# Patient Record
Sex: MALE | Race: WHITE
[De-identification: names, ages, dates, MRNs, and addresses within clinical notes are randomized per-mention and may not be internally consistent; named-entity substitution may affect disease eponyms.]

---

## 2019-01-28 ENCOUNTER — HOSPITAL ENCOUNTER (OUTPATIENT)
Dept: HOSPITAL 76 - SDS | Age: 64
Discharge: HOME | End: 2019-01-28
Attending: SURGERY
Payer: COMMERCIAL

## 2019-01-28 VITALS — SYSTOLIC BLOOD PRESSURE: 131 MMHG | DIASTOLIC BLOOD PRESSURE: 79 MMHG

## 2019-01-28 DIAGNOSIS — K43.6: Primary | ICD-10-CM

## 2019-01-28 DIAGNOSIS — K42.9: ICD-10-CM

## 2019-01-28 DIAGNOSIS — J31.0: ICD-10-CM

## 2019-01-28 PROCEDURE — 49561: CPT

## 2019-01-28 PROCEDURE — 74018 RADEX ABDOMEN 1 VIEW: CPT

## 2019-01-28 PROCEDURE — 49585: CPT

## 2019-01-28 PROCEDURE — 0WUF0JZ SUPPLEMENT ABDOMINAL WALL WITH SYNTHETIC SUBSTITUTE, OPEN APPROACH: ICD-10-PCS | Performed by: SURGERY

## 2019-01-28 PROCEDURE — 0WQF0ZZ REPAIR ABDOMINAL WALL, OPEN APPROACH: ICD-10-PCS | Performed by: SURGERY

## 2019-01-28 NOTE — ANESTHESIA
Pre-Anesthesia VS, & Labs





- Diagnosis





umbilical hernia, supra-umbilical ventral hernia





- Procedure





open umbilical hernia repair, supra-umbilical hernia (ventral) repair with mesh


Vital Signs: 





                                        











Temp Pulse Resp BP Pulse Ox


 


 36.2 C L  44 L  20   138/84 H  100 


 


 01/28/19 12:20  01/28/19 12:20  01/28/19 12:20  01/28/19 12:20  01/28/19 12:20














                                        





Height                           6 ft


Weight (kg)                      82.9 kg











- NPO


>8 hours





Home Medications and Allergies


Home Medications: 


Ambulatory Orders





Latanoprost 0.005% Ophth Drops [Xalatan Ophth Drops] 1 drops OP DAILY 01/28/19 


Timolol 0.25% Ophth Drops [Timoptic 0.25% Ophth Drops] 1 drops OP DAILY 01/28/19













                                        





Latanoprost 0.005% Ophth Drops [Xalatan Ophth Drops] 1 drops OP DAILY 01/28/19 


Timolol 0.25% Ophth Drops [Timoptic 0.25% Ophth Drops] 1 drops OP DAILY 01/28/19










Allergies/Adverse Reactions: 


                                    Allergies











Allergy/AdvReac Type Severity Reaction Status Date / Time


 


No Known Drug Allergies Allergy   Verified 01/28/19 12:44














Anes History & Medical History





- Anesthetic History


Anesthesia Complications: reports: No previous complications





- Medical History


Cardiovascular: reports: None


Pulmonary: reports: None


Gastrointestinal: reports: None


Urinary: reports: None


Musculoskeletal: reports: None


Endocrine/Autoimmune: reports: None


Skin: reports: None





- Surgical History


General: Colonoscopy


Eyes Ears Nose Throat (EENT): Other (retinal detachment surgery)





Exam


General: Alert


Dental: WNL


Mouth Opening: Greater than 4 Fingerbreadths


Neck Mobility: Normal


Mallampati classification: I


Thyromental Distance: greater than 6 cm


Respiratory: Lungs clear


Cardiovascular: Regular rate


Mental/Cognitive Status: Alert/Oriented X3





Plan


Anesthesia Type: General (backup general), MAC


Consent for Procedure(s) Verified and Reviewed: Yes


Code Status: Attempt Resuscitation


ASA classification: 1-Healthy patient


Is this case an emergency?: No

## 2019-01-28 NOTE — XRAY REPORT
Reason:  NEEDLE/SPONGE COUNT INCORRECT

Procedure Date:  01/28/2019   

Accession Number:  [id number] / [id number]                    

Procedure:  XR  - Abdomen 1 View X-Ray CPT Code:  14962

 

FULL RESULT:

 

 

EXAM:

ABDOMEN RADIOGRAPHY

 

EXAM DATE: 1/28/2019 04:12 PM.

 

CLINICAL HISTORY: NEEDLE/SPONGE COUNT INCORRECT.

 

COMPARISON: None.

 

TECHNIQUE: 1 view.

 

FINDINGS:

Bowel Gas Pattern: A single image was obtained of the central lower 

abdomen from the mid L1 level to the lower sacrum. No foreign body seen 

within this field of view

 

Other: None.

 

IMPRESSION: No foreign body seen within the central lower abdomen

 

RADIA

 

The call report notification system was initiated by Dr. Akhil Orr at 04:16 PM hrs on 1/28/2019.

ADDENDUM: 01/28/19 16:18

 

The above findings  were discussed with Dr. Shaka rByant Dr by Dr. Akhil Orr at 04:18 PM hrs on 1/28/2019.

## 2019-01-29 NOTE — OPERATIVE REPORT
DATE OF SERVICE: 01/28/2019

Physician: Shaka Bryant MD

 

PREOPERATIVE DIAGNOSES  

   1. Incarcerated ventral hernia.

   2. Reducible umbilical hernia.

 

POSTOPERATIVE DIAGNOSES

   1. Incarcerated ventral hernia.

   2. Reducible umbilical hernia.

 

PROCEDURE  

   1. Open repair of incarcerated ventral hernia.

   2. Open repair of umbilical hernia with Ventralex ST soft tissue patch.

 

ANESTHESIA:  Local plus monitored anesthesia care by Spencer Abdi CRNA.

 

SURGEON:  Shaka Bryant MD.

 

ASSISTANT:  None.

 

ESTIMATED BLOOD LOSS:  10 mL

 

COMPLICATIONS:  None.

 

FINDINGS:  Two adjacent ventral midline supraumbilical fascial defects were present, measuring 5 mm i
n diameter each, through which preperitoneal fat had herniated and become incarcerated, creating a ma
ss of approximately 3 to 4 cm in diameter.  In addition, an umbilical hernia was present with a fasci
al defect of approximately 2 cm and preperitoneal fat present, comprising the hernia sac.

 

INDICATIONS:  Patient is a 63-year-old gentleman with recent onset of a mass in the supraumbilical re
gion, just off the midline to the left.  Examination showed a reducible soft mass consistent with a c
hronically incarcerated ventral hernia.  He was also noted to have a reducible umbilical hernia.  He 
was interested in having both hernias repaired.

 

TECHNIQUE:  After informed consent, patient was taken to the operating room where he was sedated and 
monitored.  Preoperative preparation included administration of 2 grams of cefazolin intravenously an
d application of sequential calf compression boots.  His abdomen was clipped and prepared with Chlora
Prep solution and draped in the usual sterile fashion.  A field block was instituted using a 50:50 co
mbination of 1% lidocaine plain and 0.5% Marcaine with epinephrine, a total of 30 mL of the mixture w
as used.  

 

A curvilinear transverse incision was made just above the umbilicus and just caudal to the mass and c
arried down through subcutaneous tissues.  Hemostasis achieved with electrocautery and 2-0 Vicryl tie
s.  The umbilical dermis was dissected off the underlying hernia sac and the fascial edges were mobil
ized circumferentially around the umbilical fascial defect.  The hernia sac was excised and discarded
.  Dissection in a cephalad manner exposed 2 adjacent midline fascial defects through which preperito
abdoul fat had herniated and incarcerated.  This tissue was mobilized circumferentially.  The anterior 
fascia was mobilized around the neck of the 2 defects.  The tissue was amputated.  Hemostasis was ach
ieved with electrocautery and 2-0 Vicryl ties and the tissue was discarded.  The 2 supraumbilical mid
line fascial defects were reapproximated with 2 interrupted 0 Ethibond sutures transversely.  The umb
ilical defect was repaired by inserting a 4 cm diameter Ventralex ST patch that had been soaked in an
tibiotic solution containing 1 gram of cefazolin per liter.  It was placed in a preperitoneal positio
n and held in place while the fascial edges were reapproximated transversely with interrupted 0 Ethib
ond sutures.  The sutures incorporated the anchoring strap of the patch.  Excess strap was excised an
d discarded.

 

After hemostasis was assured, the wound was irrigated with antibiotic solution, following which wound
 closure was accomplished in layers using interrupted 2-0 Vicryl to reapproximate deep subcutaneous t
issues and reapproximate the umbilical dermis to the anterior fascia.  Then, 3-0 Vicryl was used to r
eapproximate superficial subcutaneous tissues and 4-0 Monocryl completed wound closure with a subcuti
cular skin closure, followed by Dermabond.  The procedure was then terminated.  Sponge counts were co
rrect.  Needle counts were incorrect by one.  A KUB of the abdomen was obtained and no foreign bodies
 were noted.  Anesthesia then terminated, and patient transferred out of the operating room in satisf
actory condition.  No drains were used.

 

 

DD: 01/28/2019 16:30

TD: 01/28/2019 16:38

Job #: [id number]

## 2020-10-10 ENCOUNTER — HOSPITAL ENCOUNTER (OUTPATIENT)
Dept: HOSPITAL 76 - DI.S | Age: 65
Discharge: HOME | End: 2020-10-10
Attending: PHYSICIAN ASSISTANT
Payer: COMMERCIAL

## 2020-10-10 ENCOUNTER — HOSPITAL ENCOUNTER (OUTPATIENT)
Dept: HOSPITAL 76 - LAB.R | Age: 65
Discharge: HOME | End: 2020-10-10
Attending: PHYSICIAN ASSISTANT
Payer: COMMERCIAL

## 2020-10-10 DIAGNOSIS — Z20.828: ICD-10-CM

## 2020-10-10 DIAGNOSIS — R06.2: Primary | ICD-10-CM

## 2020-10-10 PROCEDURE — 71046 X-RAY EXAM CHEST 2 VIEWS: CPT

## 2020-10-10 NOTE — XRAY REPORT
PROCEDURE:  Chest 2 View X-Ray

 

INDICATIONS:  WHEEZING

 

TECHNIQUE:  2 view(s) of the chest.  

 

COMPARISON:  None.

 

FINDINGS:  

 

Surgical changes and devices:  None.  

 

Lungs and pleura:  No pleural effusions or pneumothorax.  Lungs are clear.  

 

Mediastinum:  Mediastinal contours are normal.  Heart size is normal.  

 

Bones and chest wall:  No suspicious bony abnormalities.  Soft tissues appear unremarkable.  

 

IMPRESSION:  Chest without acute cardiopulmonary abnormalities or focal airspace disease.

 

Reviewed by: Jose Mason MD on 10/10/2020 2:12 PM PDT

Approved by: Jose Mason MD on 10/10/2020 2:12 PM PDT

 

 

Station ID:  [id number]

## 2021-01-14 ENCOUNTER — HOSPITAL ENCOUNTER (OUTPATIENT)
Dept: HOSPITAL 76 - RT | Age: 66
Discharge: HOME | End: 2021-01-14
Attending: OTOLARYNGOLOGY
Payer: COMMERCIAL

## 2021-01-14 ENCOUNTER — HOSPITAL ENCOUNTER (OUTPATIENT)
Dept: HOSPITAL 76 - LAB | Age: 66
Discharge: HOME | End: 2021-01-14
Attending: OTOLARYNGOLOGY
Payer: COMMERCIAL

## 2021-01-14 DIAGNOSIS — Z01.812: Primary | ICD-10-CM

## 2021-01-14 DIAGNOSIS — Z01.818: Primary | ICD-10-CM

## 2021-01-14 DIAGNOSIS — J34.2: ICD-10-CM

## 2021-01-14 LAB
ALBUMIN DIAFP-MCNC: 4.3 G/DL (ref 3.2–5.5)
ALBUMIN/GLOB SERPL: 1.4 {RATIO} (ref 1–2.2)
ALP SERPL-CCNC: 40 IU/L (ref 42–121)
ALT SERPL W P-5'-P-CCNC: 32 IU/L (ref 10–60)
ANION GAP SERPL CALCULATED.4IONS-SCNC: 11 MMOL/L (ref 6–13)
AST SERPL W P-5'-P-CCNC: 28 IU/L (ref 10–42)
BASOPHILS NFR BLD AUTO: 0 10^3/UL (ref 0–0.1)
BASOPHILS NFR BLD AUTO: 0.7 %
BILIRUB BLD-MCNC: 1 MG/DL (ref 0.2–1)
BUN SERPL-MCNC: 18 MG/DL (ref 6–20)
CALCIUM UR-MCNC: 9.4 MG/DL (ref 8.5–10.3)
CHLORIDE SERPL-SCNC: 99 MMOL/L (ref 101–111)
CO2 SERPL-SCNC: 27 MMOL/L (ref 21–32)
CREAT SERPLBLD-SCNC: 0.9 MG/DL (ref 0.6–1.2)
EOSINOPHIL # BLD AUTO: 0.6 10^3/UL (ref 0–0.7)
EOSINOPHIL NFR BLD AUTO: 10.1 %
ERYTHROCYTE [DISTWIDTH] IN BLOOD BY AUTOMATED COUNT: 11.9 % (ref 12–15)
GLOBULIN SER-MCNC: 3 G/DL (ref 2.1–4.2)
GLUCOSE SERPL-MCNC: 112 MG/DL (ref 70–100)
HGB UR QL STRIP: 15.7 G/DL (ref 14–18)
LYMPHOCYTES # SPEC AUTO: 1.9 10^3/UL (ref 1.5–3.5)
LYMPHOCYTES NFR BLD AUTO: 31.7 %
MCH RBC QN AUTO: 32.3 PG (ref 27–31)
MCHC RBC AUTO-ENTMCNC: 34.7 G/DL (ref 32–36)
MCV RBC AUTO: 93 FL (ref 80–94)
MONOCYTES # BLD AUTO: 0.7 10^3/UL (ref 0–1)
MONOCYTES NFR BLD AUTO: 11.5 %
NEUTROPHILS # BLD AUTO: 2.7 10^3/UL (ref 1.5–6.6)
NEUTROPHILS # SNV AUTO: 5.8 X10^3/UL (ref 4.8–10.8)
NEUTROPHILS NFR BLD AUTO: 45.8 %
PDW BLD AUTO: 9.2 FL (ref 7.4–11.4)
PLATELET # BLD: 207 10^3/UL (ref 130–450)
PROT SPEC-MCNC: 7.3 G/DL (ref 6.7–8.2)
RBC MAR: 4.86 10^6/UL (ref 4.7–6.1)
SODIUM SERPLBLD-SCNC: 137 MMOL/L (ref 135–145)

## 2021-01-14 PROCEDURE — 36415 COLL VENOUS BLD VENIPUNCTURE: CPT

## 2021-01-14 PROCEDURE — 93005 ELECTROCARDIOGRAM TRACING: CPT

## 2021-01-14 PROCEDURE — 85025 COMPLETE CBC W/AUTO DIFF WBC: CPT

## 2021-01-14 PROCEDURE — 80053 COMPREHEN METABOLIC PANEL: CPT

## 2022-10-24 ENCOUNTER — HOSPITAL ENCOUNTER (OUTPATIENT)
Dept: HOSPITAL 76 - DI.S | Age: 67
Discharge: HOME | End: 2022-10-24
Attending: PHYSICIAN ASSISTANT
Payer: COMMERCIAL

## 2022-10-24 DIAGNOSIS — R05.9: Primary | ICD-10-CM

## 2022-10-24 NOTE — XRAY REPORT
PROCEDURE:  Chest 2 View X-Ray

 

INDICATIONS:  COUGH

 

TECHNIQUE:  2 view(s) of the chest.  

 

COMPARISON:  None.

 

FINDINGS:  

 

Surgical changes and devices:  None.  

 

Lungs and pleura:  No pleural effusions or pneumothorax.  Lungs are clear.  

 

Mediastinum:  Mediastinal contours are normal.  Heart size is normal.  

 

Bones and chest wall:  No suspicious bony abnormalities.  Soft tissues appear unremarkable.  

 

 

IMPRESSION:  No acute cardiopulmonary pathology.

 

Reviewed by: Joseph Painting MD on 10/24/2022 4:37 PM PDT

Approved by: Joseph Painting MD on 10/24/2022 4:37 PM PDT

 

 

Station ID:  [id number]

## 2022-11-04 ENCOUNTER — HOSPITAL ENCOUNTER (OUTPATIENT)
Dept: HOSPITAL 76 - SDS | Age: 67
Discharge: HOME | End: 2022-11-04
Attending: SURGERY
Payer: COMMERCIAL

## 2022-11-04 VITALS — SYSTOLIC BLOOD PRESSURE: 120 MMHG | DIASTOLIC BLOOD PRESSURE: 83 MMHG

## 2022-11-04 DIAGNOSIS — K40.90: Primary | ICD-10-CM

## 2022-11-04 PROCEDURE — 49505 PRP I/HERN INIT REDUC >5 YR: CPT

## 2022-11-04 NOTE — ANESTHESIA
Pre-Anesthesia VS, & Labs





- Diagnosis





inguinal hernia





- Procedure





inguinal hernia repair


Vital Signs: 





                                        











Temp Pulse Resp BP Pulse Ox O2 Flow Rate


 


 36.4 C L  64   20   159/83 H  100    


 


 11/04/22 07:54  11/04/22 07:54  11/04/22 07:54  11/04/22 07:54  11/04/22 07:54 

 











Height: 6 ft


Weight (kg): 78 kg


Body Mass Index: 23.3


BMI Classification: Normal





- NPO


>8 hours





Home Medications and Allergies


Home Medications: 


Ambulatory Orders





Budesonide/Formoterol Fumarate [Symbicort 160-4.5 Mcg Inhaler] 1 puffs IH DAILY 

11/04/22 











                                        





Latanoprost 0.005% Ophth Drops [Xalatan Ophth Drops] 1 drops EACHEYE QPM 

01/28/19 


Timolol 0.25% Ophth Drops [Timoptic 0.25% Ophth Drops] 1 drops EACHEYE BID 

01/28/19 


Budesonide/Formoterol Fumarate [Symbicort 160-4.5 Mcg Inhaler] 1 puffs IH DAILY 

11/04/22 








Allergies/Adverse Reactions: 


                                    Allergies











Allergy/AdvReac Type Severity Reaction Status Date / Time


 


No Known Drug Allergies Allergy   Verified 01/28/19 12:44














Anes History & Medical History





- Anesthetic History


Anesthesia Complications: reports: No previous complications, Difficult airway


Family history of Anesthesia Complications: Denies


Family history of Malignant Hyperthermia: Denies





- Medical History


Cardiovascular: reports: None


Pulmonary: reports: None


Gastrointestinal: reports: None


Urinary: reports: None


Musculoskeletal: reports: Other


Endocrine/Autoimmune: reports: None


Skin: reports: Eczema





- Surgical History


General: reports: Colonoscopy


Eyes Ears Nose Throat (EENT): reports: Cataracts, Detached retina repair, Other





Exam


General: Alert, Oriented x3, Cooperative


Dental: WNL


Mouth Opening: 3 Fingerbreadth


Neck Mobility: Normal


Mallampati classification: II


Respiratory: Lungs clear


Cardiovascular: Regular rate





Plan


Anesthesia Type: Total IV


Consent for Procedure(s) Verified and Reviewed: Yes


Code Status: Attempt Resuscitation


ASA classification: 2-Mild systemic disease


Is this case an emergency?: No

## 2022-11-04 NOTE — ANESTHESIA POST OP EVALUATION
Anesthesia Post Eval





- Post Anesthesia Eval


Vitals: 





                                Last Vital Signs











Temp  37 C   11/04/22 11:32


 


Pulse  53 L  11/04/22 11:32


 


Resp  12   11/04/22 11:32


 


BP  120/83 H  11/04/22 11:32


 


Pulse Ox  98   11/04/22 11:32


 


O2 Flow Rate      











CV Function Including HR & BP: Stable


Pain Control: Satisfactory


Nausea & Vomiting: Negative


Mental Status: Baseline


Respiratory Status: Airway Patent


Hydration Status: Satisfactory


Anesthesia Complications: None

## 2022-11-04 NOTE — OPERATIVE REPORT
Operative Report





- General


Planned Procedure: 





open left inguinal hernia repair


Pre-Op Diagnosis: left inguinal hernia


Procedure Performed: 





open left inguinal hernia repair with mesh


Post Op Diagnosis: left inguinal hernia, indirect





- Procedure Note


Primary Surgeon: manan rock


Anesthesia Technique: Local, MAC


Pathology: 





not sent


Estimated Blood Loss (mL): 2


Drain/Tube Type: Other (none)


Indications: 





painful hernia


Findings: 





as above


Complications: 





none





- Other


Other Information/Narrative: 





Inguinal hernia patient was properly identified brought to the operating room 

and placed in supine position.  Sequential compression devices were placed.  

Monitored anesthesia care was given. He was prepped and draped in a sterile 

fashion and given preoperative antibiotics.  Local anesthetic was given 

throughout the procedure.  A 5 cm incision was made in the direction of Hipolito's

lines just cephalad of the pubic tubercle.  Dissection proceeded with cutting 

current cautery.  The superficial epigastric vein was identified clamped divided

and tied with 3-0 Vicryl.  Dissection proceeded down to the aponeurosis.  The 

aponeurosis was opened in the direction of its fibers and extended to the 

external ring.  Cord structures were mobilized and brought up.   The nerves were

carefully protected and preserved.   Cord structures were mobilized and brought 

up.   An indirect inguinal hernia was present.  The hernia sac was mobilized off

the cord structures and suture ligated with 2 O silk and further reduced.   

Preperitoneal fat was removed.  The base was tied with 2 O vicryl.  Polypro

pylene mesh was cut to size and with tails.  The mesh was secured with multiple 

interrupted 0 Ethibond sutures.  She was placed along the pubic tubercle, 

Dev's ligament area and along the shelving border of Poupart's ligament.  

Sutures were placed medially along the abdominal wall musculature and internal 

oblique.  The medial tail of the mesh was secured to the shelving border of 

Poupart's ligament with 3 interrupted 0 ethibond sutures recreating the internal

ring of appropriate size.  An additional suture was placed in the crotch of the 

mesh recreating an internal ring of appropriate size.  Aponeurosis was closed 

with a running 2-0 Vicryl suture.  The opposite was closed with interrupted 3-0 

Vicryl suture.  Buried interrupted subdermal 3-0 Vicryl sutures were then 

placed.  And was closed with a running 4-0 Monocryl subcuticular suture.  

Dressing was applied.  Patient was awakened and brought to recovery in good 

condition.

## 2022-11-04 NOTE — OPERATIVE REPORT
Operative Report





- General


Procedure Date: 11/04/22


Planned Procedure: 





open left inguinal hernia repair with mesh


Pre-Op Diagnosis: left inguinal hernia


Procedure Performed: 





open left inguinal hernia repair with mesh


Post Op Diagnosis: indirect inguinal hernia





- Procedure Note


Primary Surgeon: manan rock


Anesthesia Technique: Local, MAC


Pathology: 





not sent


Estimated Blood Loss (mL): 2


Drain/Tube Type: Other (none)


Indications: 





painful hernia bulge


Findings: 





as above


Complications: 





none





- Other


Other Information/Narrative: 





Patient was properly identified brought to the operating room and placed in 

supine position.  Sequential compression devices were placed.  Mpnitored 

anesthesia care was given.  He was prepped and draped in a sterile fashion and 

given preoperative antibiotics.  Local anesthetic was given throughout the 

procedure.  A 5 cm incision was made in the direction of Hipolito's lines just 

cephalad of the pubic tubercle.  Dissection proceeded with cutting current 

cautery.  The superficial epigastric vein was identified clamped divided and 

tied with 3-0 Vicryl.  Dissection proceeded down to the aponeurosis.  The 

aponeurosis was opened in the direction of its fibers and extended to the 

external ring.  Cord structures were mobilized and brought up.   The nerves were

carefully protected and preserved.   Cord structures were mobilized and brought 

up.   An indirect inguinal hernia was present.  The hernia sac was mobilized off

the cord structures and suture ligated with 2 O silk and further reduced.   

Preperitoneal fat was removed.  The base was tied with 2 O vicryl.  

Polypropylene mesh was cut to size and with tails.  The mesh was secured with 

multiple interrupted 0 Ethibond sutures.  She was placed along the pubic 

tubercle, Dev's ligament area and along the shelving border of Poupart's 

ligament.  Sutures were placed medially along the abdominal wall musculature and

internal oblique.  The medial tail of the mesh was secured to the shelving 

border of Poupart's ligament with 3 interrupted 0 ethibond sutures recreating 

the internal ring of appropriate size.  An additional suture was placed in the 

crotch of the mesh recreating an internal ring of appropriate size.  Aponeurosis

was closed with a running 2-0 Vicryl suture.  The opposite was closed with 

interrupted 3-0 Vicryl suture.  Buried interrupted subdermal 3-0 Vicryl sutures 

were then placed.  And was closed with a running 4-0 Monocryl subcuticular 

suture.  Dressing was applied.  Patient was awakened and brought to recovery in 

good condition. Alert and oriented to person, place and time/Awake

## 2022-11-07 ENCOUNTER — HOSPITAL ENCOUNTER (EMERGENCY)
Dept: HOSPITAL 76 - ED | Age: 67
LOS: 1 days | Discharge: HOME | End: 2022-11-08
Payer: COMMERCIAL

## 2022-11-07 DIAGNOSIS — K59.00: Primary | ICD-10-CM

## 2022-11-07 LAB
ALBUMIN DIAFP-MCNC: 3.9 G/DL (ref 3.2–5.5)
ALBUMIN/GLOB SERPL: 1.3 {RATIO} (ref 1–2.2)
ALP SERPL-CCNC: 44 IU/L (ref 42–121)
ALT SERPL W P-5'-P-CCNC: 36 IU/L (ref 10–60)
ANION GAP SERPL CALCULATED.4IONS-SCNC: 9 MMOL/L (ref 6–13)
AST SERPL W P-5'-P-CCNC: 31 IU/L (ref 10–42)
BASOPHILS NFR BLD AUTO: 0.1 10^3/UL (ref 0–0.1)
BASOPHILS NFR BLD AUTO: 0.5 %
BILIRUB BLD-MCNC: 0.9 MG/DL (ref 0.2–1)
BUN SERPL-MCNC: 16 MG/DL (ref 6–20)
CALCIUM UR-MCNC: 9.1 MG/DL (ref 8.5–10.3)
CHLORIDE SERPL-SCNC: 96 MMOL/L (ref 101–111)
CO2 SERPL-SCNC: 27 MMOL/L (ref 21–32)
CREAT SERPLBLD-SCNC: 0.7 MG/DL (ref 0.6–1.2)
EOSINOPHIL # BLD AUTO: 0.2 10^3/UL (ref 0–0.7)
EOSINOPHIL NFR BLD AUTO: 1.8 %
ERYTHROCYTE [DISTWIDTH] IN BLOOD BY AUTOMATED COUNT: 12.6 % (ref 12–15)
GFRSERPLBLD MDRD-ARVRAT: 112 ML/MIN/{1.73_M2} (ref 89–?)
GLOBULIN SER-MCNC: 3 G/DL (ref 2.1–4.2)
GLUCOSE SERPL-MCNC: 136 MG/DL (ref 70–100)
HCT VFR BLD AUTO: 44.6 % (ref 42–52)
HGB UR QL STRIP: 15.3 G/DL (ref 14–18)
LIPASE SERPL-CCNC: 29 U/L (ref 22–51)
LYMPHOCYTES # SPEC AUTO: 1.7 10^3/UL (ref 1.5–3.5)
LYMPHOCYTES NFR BLD AUTO: 15.5 %
MCH RBC QN AUTO: 31.4 PG (ref 27–31)
MCHC RBC AUTO-ENTMCNC: 34.3 G/DL (ref 32–36)
MCV RBC AUTO: 91.4 FL (ref 80–94)
MONOCYTES # BLD AUTO: 0.9 10^3/UL (ref 0–1)
MONOCYTES NFR BLD AUTO: 8.2 %
NEUTROPHILS # BLD AUTO: 8 10^3/UL (ref 1.5–6.6)
NEUTROPHILS # SNV AUTO: 10.8 X10^3/UL (ref 4.8–10.8)
NEUTROPHILS NFR BLD AUTO: 73.7 %
NRBC # BLD AUTO: 0 /100WBC
NRBC # BLD AUTO: 0 X10^3/UL
PDW BLD AUTO: 9 FL (ref 7.4–11.4)
PLATELET # BLD: 196 10^3/UL (ref 130–450)
POTASSIUM SERPL-SCNC: 4.3 MMOL/L (ref 3.5–5)
PROT SPEC-MCNC: 6.9 G/DL (ref 6.7–8.2)
RBC MAR: 4.88 10^6/UL (ref 4.7–6.1)
SODIUM SERPLBLD-SCNC: 132 MMOL/L (ref 135–145)

## 2022-11-07 PROCEDURE — 85025 COMPLETE CBC W/AUTO DIFF WBC: CPT

## 2022-11-07 PROCEDURE — 80053 COMPREHEN METABOLIC PANEL: CPT

## 2022-11-07 PROCEDURE — 83690 ASSAY OF LIPASE: CPT

## 2022-11-07 PROCEDURE — 36415 COLL VENOUS BLD VENIPUNCTURE: CPT

## 2022-11-07 PROCEDURE — 99283 EMERGENCY DEPT VISIT LOW MDM: CPT

## 2022-11-07 NOTE — EXTERNAL MEDICAL SUMMARY RPT
Continuity of Care Document

                           Created on:2022



Patient:Doc White

Sex:Male

:1955

External Reference #:[id number]





Demographics







                          Address                   [address] [place] [address]

 

                          Phone                     Unavailable

 

                          Preferred Language        English

 

                          Marital Status            

 

                          Advent Affiliation     Unknown

 

                          Race                      White

 

                          Ethnic Group              Not  or 









Author







                          Organization              [facility]

 

                          [place] [address]

 

                          Phone                     [phone]









Care Team Providers







                    Name                Role                Phone

 

                    Unavailable         Unavailable         Unavailable

 

                    Kulwinder San Md  Unavailable         Unavailable

 

                    Alvarado Felipe, Adam   Unavailable         Unavailable

 

                    Sammie, Provider       Unavailable         Unavailable

 

                    Luis Corrales, Referrals, Dominique Unavailable         Unavailable

 

                    Raven Florence Ma     Unavailable         Unavailable

 

                    Raven Florence Ma     Unavailable         Unavailable









Allergies

No information.



Encounters

No information.



Functional Status

No information.



Immunizations

No information.



Medications







                     date                description         facility

 

                     [id number]+0000  budesonide-formoterol  [facility]



                                                            [facility]

[facility]

 

                     [id number]+0000  budesonide-formoterol  [facility] 

[facility]

[facility]z

 

                     [id number]+0000  budesonide-formoterol  [facility] 

[facility]

[facility]z

 

                     [id number]+0000  budesonide-formoterol  [facility] 

[facility]

[facility]z

 

                     [id number]+0000  budesonide-formoterol  [facility] 

[facility]

[facility]

 

                     [id number]+0000  budesonide-formoterol  [facility] 

[facility]

jez

 

                     [id number]+0000  ondansetron         [facility]



                                                            [facility]

jez

 

                     [id number]+0000  ondansetron         [facility]



                                                            [facility]

jez

 

                     [phone]+0000  ondansetron         [facility]



                                                            [facility]

jez

 

                     [id number]+0000  ondansetron         [facility]



                                                            [facility]

jez

 

                     [id number]+0000  ondansetron         [facility]



                                                            [facility]

jez

 

                     [id number]+0000  ondansetron         [facility]



                                                            [facility]

jez

 

                     [id number]+0000  azithromycin        [facility]



                                                            [facility]

jez

 

                     [id number]+0000  azithromycin        [facility]

[facility]

[facility]

 

                     [id number]+0000  azithromycin        [facility]

[facility]

 

                     [id number]+0000  azithromycin        [facility]

[facility]

[facility]

 

                     [id number]+0000  azithromycin        [facility]

[facility]

[facility]

 

                     [id number]+0000  ondansetron         [facility]

[facility]

[facility]

 

                     [id number]+0000  ondansetron         [facility]

[facility]

[facility]

 

                     [id number]+0000  ondansetron         [facility]

[facility]

[facility]

 

                     [phone]+0000  ondansetron         [facility]

[facility]

[facility]

 

                     [phone]+0000  ondansetron         [facility]

[facility]

[facility]

 

                     [id number]+0000  ondansetron         [facility]

[facility]

[facility]

 

                     [id number]+0000  budesonide-formoterol  [facility] 

[facility]

[facility]

 

                     [phone]+0000  budesonide-formoterol  [facility] 

[facility]

[facility]

 

                     [id number]+0000  budesonide-formoterol  [facility] 

[facility]

[facility]

 

                     [phone]+0000  budesonide-formoterol  [facility] 

[facility]

[facility]

 

                     [id number]+0000  budesonide-formoterol  [facility] 

[facility]



                                                            [facility]

[facility]

 

                     [id number]+0000  budesonide-formoterol  [facility] 

[facility]

[facility]

 

                     [phone]+0000  budesonide-formoterol  [facility] 

[facility]

[facility]

 

                     [id number]+0000  budesonide-formoterol  [facility] 

[facility]



                                                            [facility]

[facility]

 

                     [id number]+0000  budesonide-formoterol  [facility] 

[facility]



                                                            [facility]

[facility]

 

                     [id number]+0000  budesonide-formoterol  [facility] 

[facility]



                                                            [facility]

[facility]

 

                     [phone]+0000  budesonide-formoterol  [facility] 

[facility]



                                                            [facility]

[facility]

 

                     [id number]+0000  budesonide-formoterol  [facility] 

[facility]



                                                            [facility]

[facility]

 

                     [id number]+0000  benzonatate         [facility]

[facility]

[facility]

 

                     [id number]+0000  benzonatate         [facility]

[facility]

[facility]

 

                     [phone]+0000  benzonatate         [facility]

[facility]

[facility]

 

                     [id number]+0000  benzonatate         [facility]

[facility]

jez

 

                     [id number]+0000  benzonatate         [facility]

[facility]

[facility]

 

                     [phone]+0000  benzonatate         [facility]

[facility]

[facility]

 

                     [id number]+0000  benzonatate         [facility]

[facility]

[facility]

 

                     [id number]+0000  benzonatate         [facility]

[facility]

[facility]

 

                     [id number]+0000  benzonatate         [facility]

[facility]

[facility]

 

                     [id number]+0000  benzonatate         [facility]

[facility]

[facility]

 

                     [id number]+0000  azithromycin        [facility]

[facility]

 

                     [id number]+0000  azithromycin        [facility]

[facility]

 

                     [phone]+0000  azithromycin        [facility]

[facility]

[facility]

 

                     [id number]+0000  azithromycin        [facility]

[facility]

[facility]

 

                     [id number]+0000  azithromycin        [facility]

[facility]

 

                     [phone]+0000  DEXAMETHASONE SODIUM PHOSPHATE  [facility]

[facility]



                                                            [facility]

[facility]

 

                     [id number]+0000  DEXAMETHASONE SODIUM PHOSPHATE  [facility]

[facility]

[facility]

 

                     [id number]+0000  DEXAMETHASONE SODIUM PHOSPHATE  [facility]

[facility]

[facility]

 

                     [id number]+0000  DEXAMETHASONE SODIUM PHOSPHATE  [facility]

[facility]

[facility]

 

                     [id number]+0000  DEXAMETHASONE SODIUM PHOSPHATE  [facility]

[facility]

[facility]

 

                     [id number]+0000  benzonatate         [facility]

[facility]

[facility]

 

                     [phone]+0000  benzonatate         [facility]

[facility]

[facility]

 

                     [phone]+0000  benzonatate         [facility]

[facility]

[facility]

 

                     [phone]+0000  benzonatate         [facility]

[facility]

[facility]

 

                     [id number]+0000  benzonatate         [facility]

[facility]

[facility]

 

                     [phone]+0000  budesonide-formoterol  [facility] 

[facility]

[facility]

 

                     [id number]+0000  budesonide-formoterol  [facility] 

[facility]

[facility]

 

                     [id number]+0000  budesonide-formoterol  [facility] 

[facility]

[facility]

 

                     [phone]+0000  budesonide-formoterol  [facility] 

[facility]



                                                            [facility]

[facility]

 

                     [id number]+0000  budesonide-formoterol  [facility] 

[facility]



                                                            [facility]

[facility]

 

                     [phone]+0000  budesonide-formoterol  [facility] 

[facility]



                                                            [facility]

[facility]

 

                     [phone]+0000  ondansetron         [facility]

[facility]

[facility]

 

                     [id number]+0000  ondansetron         [facility]

[facility]

[facility]

 

                     [phone]+0000  ondansetron         [facility]

[facility]

[facility]

 

                     [phone]+0000  ondansetron         [facility]

[facility]

[facility]

 

                     [phone]+0000  ondansetron         [facility]

[facility]

[facility]

 

                     [id number]+0000  ondansetron         [facility]

[facility]

[facility]

 

                     [id number]+0000  azithromycin        [facility]

[facility]

[facility]

 

                     [id number]+0000  azithromycin        [facility]

[facility]

[facility]

 

                     [id number]+0000  azithromycin        [facility]

[facility]

[facility]

 

                     [id number]+0000  azithromycin        [facility]

[facility]

[facility]

 

                     [id number]+0000  azithromycin        [facility]

[facility]

[facility]

 

                     [id number]+0000  azithromycin        [facility]

[facility]

[facility]

 

                     [phone]+0000  azithromycin        [facility]

[facility]

[facility]

 

                     [id number]+0000  azithromycin        [facility]

[facility]

[facility]

 

                     [id number]+0000  azithromycin        [facility]

[facility]

[facility]

 

                     [id number]+0000  azithromycin        [facility]

[facility]

[facility]

 

                     [id number]+0000  ondansetron         [facility]

[facility]

[facility]

 

                     [id number]+0000  ondansetron         [facility]

[facility]

[facility]

 

                     [id number]+0000  ondansetron         [facility]

[facility]

[facility]

 

                     [phone]+0000  ondansetron         [facility]

[facility]

[facility]

 

                     [phone]+0000  ondansetron         [facility]

[facility]



                                                            [facility]

jez

 

                     [id number]+0000  ondansetron         [facility]

[facility]



                                                            [facility]

[facility]z

 

                     [id number]+0000  benzonatate         [facility]

[facility]

[facility]

 

                     [phone]+0000  benzonatate         [facility]

[facility]

[facility]

 

                     [phone]+0000  benzonatate         [facility]

[facility]

[facility]

 

                     [id number]+0000  benzonatate         [facility]

[facility]

[facility]

 

                     [phone]+0000  benzonatate         [facility]

[facility]

[facility]







Problems

No information.



Procedures







                     date                description         facility

 

                     [id number]+0000  Visit Code Hold     [facility]

[facility]

 

                     [id number]+0000  Visit Code Hold     [facility]

[facility]

[facility]

 

                     [id number]+0000  Visit Code Hold     [facility]

[facility]

[facility]

 

                     [phone]+0000  Visit Code Hold     [facility]

[facility]

[facility]

 

                     [id number]+0000  Visit Code Hold     [facility]

[facility]

[facility]

 

                     [phone]+0000  Visit Code Hold     [facility]

[facility]



                                                            [facility] VALERIE story

 

                     [id number]+0000  Visit Code Hold     [facility]

[facility]

[facility]

 

                     [phone]+0000  Visit Code Hold     [facility]

[facility]

[facility]z

 

                     [id number]+0000  Visit Code Hold     [facility]

[facility]

jez

 

                     [id number]+0000  Visit Code Hold     [facility]

[facility]

[facility]z

 

                     [id number]+0000  Visit Code Hold     [facility]

[facility]

jez

 

                     [phone]+0000  Visit Code Hold     [facility]

[facility]



                                                            [facility]

jez

 

                     [phone]+0000  Visit Code Hold     [facility]

[facility]



                                                            Anc[facility] Services [facility]

jez

 

                     [id number]+0000  Visit Code Hold     [facility]

[facility]



                                                            Ancillary Services VALERIE story

 

                     [phone]+0000  COVID, FLU A+B Antigen (In  [facility]

in [facility] Care &



                                        Clinic Free Test)   Ancillary Services [facility] story

 

                     [id number]+0000  COVID, FLU A+B Antigen (In  [facility]

in [facility] &



                                        Clinic Free Test)   Ancillary Services VALERIE story

 

                     [id number]+0000  COVID, FLU A+B Antigen (In  [facility]

in [facility] &



                                        Clinic Free Test)   Ancillary Services VALERIE story

 

                     [id number]+0000  COVID, FLU A+B Antigen (In  [facility]

in [facility] &



                                        Clinic Free Test)   Ancillary Services [facility] story

 

                     [phone]+0000  COVID, FLU A+B Antigen (In  [facility]

in [facility] &



                                        Clinic Free Test)   Ancillary Services [facility]ERIE story

 

                     [id number]+0000  COVID, FLU A+B Antigen (In  [facility]

in [facility] Care &



                                        Clinic Free Test)   Ancillary Services VALERIE story

 

                     [id number]+0000  COVID, FLU A+B Antigen (In  [facility]

in [facility] &



                                        Clinic Free Test)   Ancillary Services [facility]ERIE story

 

                     [phone]+0000  COVID, FLU A+B Antigen (In  [facility]

in [facility] Care &



                                        Clinic Free Test)   Ancillary Services VALERIE story

 

                     [id number]+0000  COVID, FLU A+B Antigen (In  [facility]

in [facility] Care &



                                        Clinic Free Test)   Ancillary Services C

jez

 

                     [id number]+0000  COVID, FLU A+B Antigen (In  [facility]



                                        [facility] Free Test)   Ancillary Services C

jez

 

                     [id number]  COVID, FLU A+B Antigen (In  [facility]



                                        [facility] Free Test)   Ancillary Services C

[place]







Results/Labs

No information.



Social History







                     date                description         facility

 

                     [id number]  Never smoker        [facility]



                                                            Services [place]

 

                     [id number]  Never smoker        [facility]



                                                            Services [place]

 

                     [id number]+0000  Never smoker        [facility]



                                                            Services [place]

 

                     [id number]+0000  Never smoker        [facility]



                                                            Services [place]

 

                     [id number]+0000  Never smoker        [facility]



                                                            Services [place]

 

                     [id number]+0000  Never smoker        [facility]



                                                            Services [place]

 

                     [id number]+0000  Never smoker        [facility]



                                                            Services [place]

 

                     [id number]+0000  Never smoker        [facility]



                                                            Services [place]

 

                     [id number]+0000  Never smoker        [facility]



                                                            Services [place]

 

                     [id number]+0000  Never smoker        [facility]



                                                            Services [place]

 

                     [id number]  Never smoker        [facility]



                                                            Services [place]







Vital Signs







                 date            measurement     value           units









              [id number]+0000  BMI          BMI          24.61        kg/m2

 

              [id number]+0000  BP_diastolic  BP_diastolic  85           mmHg

 

              [id number]+0000  BP_systolic  BP_systolic  141          mmHg

 

              [id number]+0000  heart_rate   heart_rate   65           /min

 

              [id number]+0000  height_metric  height_metric  179.71       cm

 

              [id number]+0000  height_standard  height_standard  70.75      

  in

 

              [id number]+0000  respiration_rate  respiration_rate  19       

    /min

 

              [id number]+0000  temperature_metric  temperature_metric  36.94

        C

 

              [id number]+0000  temperature_standard  temperature_standard  9

8.5         F

 

              [id number]+0000  weight_metric  weight_metric  79.2         kg

 

              [id number]+0000  weight_standard  weight_standard  174.6      

  lb

 

              [id number]+0000  BMI          BMI          24.39        kg/m2

 

              90443017363413+0000  BP_diastolic  BP_diastolic  85           mmHg

 

              [id number]+0000  BP_systolic  BP_systolic  164          mmHg

 

              15216148143034+0000  heart_rate   heart_rate   64           /min

 

              95447401701416+0000  height_metric  height_metric  179.71       cm

 

              [id number]+0000  height_standard  height_standard  70.75      

  in

 

              [id number]+0000  respiration_rate  respiration_rate  17       

    /min

 

              82921448085259+0000  temperature_metric  temperature_metric  36.44

        C

 

              [id number]+0000  temperature_standard  temperature_standard  9

7.6         F

 

              [id number]+0000  weight_metric  weight_metric  78.47        kg

 

              99657462527791+0000  weight_standard  weight_standard  173        

  lb

 

              [id number]+0000  BMI          BMI          24.36        kg/m2

 

              70971589468479+0000  BP_diastolic  BP_diastolic  76           mmHg

 

              [id number]+0000  BP_systolic  BP_systolic  153          mmHg

 

              73928350131866+0000  heart_rate   heart_rate   57           /min

 

              55393417898694+0000  height_metric  height_metric  179.71       cm

 

              01993029775040+0000  height_standard  height_standard  70.75      

  in

 

              [id number]+0000  respiration_rate  respiration_rate  16       

    /min

 

              [id number]+0000  temperature_metric  temperature_metric  36.17

        C

 

              [id number]+0000  temperature_standard  temperature_standard  9

7.1         F

 

              [id number]+0000  weight_metric  weight_metric  78.38        kg

 

              [id number]+0000  weight_standard  weight_standard  172.8      

  lb

## 2022-11-08 VITALS — DIASTOLIC BLOOD PRESSURE: 79 MMHG | SYSTOLIC BLOOD PRESSURE: 144 MMHG

## 2022-11-08 NOTE — ED PHYSICIAN DOCUMENTATION
History of Present Illness





- Stated complaint


Stated Complaint: CONSTIPATION





- Chief complaint


Chief Complaint: Abd Pain





- History obtained from


History obtained from: Patient





- Additonal information


Additional information: 





66yM presents s/p hernia repair surgery friday with subsequent inability to have

normal BM. he has been on pain medication and stool regimen (softener and 

laxative) but has had only small amount of soft brown mushy stool come out. 

denies abdominal pain, urinary sx, fever.





Review of Systems


Ten Systems: 10 systems reviewed and negative


Constitutional: denies: Fever


GI: reports: Constipation.  denies: Abdominal Pain, Nausea, Vomiting, Diarrhea, 

Bloody / black stool


: denies: Dysuria





PD PAST MEDICAL HISTORY





- Past Medical History


Past Medical History: Yes


Cardiovascular: None


Respiratory: None


Endocrine/Autoimmune: None


GI: None


: None


HEENT: Chronic vision loss, Glaucoma, Chronic sinusitis


Psych: None


Musculoskeletal: Other


Derm: Eczema





- Past Surgical History


Past Surgical History: Yes


General: Colonoscopy


HEENT: Cataracts, Detached retina repair, Other





- Present Medications


Home Medications: 


                                Ambulatory Orders











 Medication  Instructions  Recorded  Confirmed


 


Latanoprost 0.005% Ophth Drops 1 drops EACHEYE QPM 01/28/19 11/03/22





[Xalatan Ophth Drops]   


 


Timolol 0.25% Ophth Drops 1 drops EACHEYE BID 01/28/19 11/03/22





[Timoptic 0.25% Ophth Drops]   


 


Budesonide/Formoterol Fumarate 1 puffs IH DAILY 11/04/22 11/04/22





[Symbicort 160-4.5 Mcg Inhaler]   


 


HYDROcod/ACETAM 5/325 [Norco 5/325] 1 each PO Q6H PRN #30 tablet 11/04/22 


 


Mineral Oil [Mineral Oil Enema] 1 ea RC QDBREAKFAST PRN 10 Days 11/08/22 





 #133 ml  














- Allergies


Allergies/Adverse Reactions: 


                                    Allergies











Allergy/AdvReac Type Severity Reaction Status Date / Time


 


No Known Drug Allergies Allergy   Verified 01/28/19 12:44














- Social History


Does the pt smoke?: No


Smoking Status: Never smoker


Does the pt drink ETOH?: No


Does the pt have substance abuse?: No





- Immunizations


Immunizations are current?: Yes





PD ED PE NORMAL





- Vitals


Vital signs reviewed: Yes





- General


General: Alert and oriented X 3, No acute distress, Well developed/nourished





- HEENT


HEENT: Atraumatic, EOMI





- Abdomen


Abdomen: Non tender, Non distended





- Rectal


Rectal: Other (external and internal nonthrombosed hemorrhoids. soft brown stool

in rectal vault. enema instilled)





Results





- Vitals


Vitals: 


                               Vital Signs - 24 hr











  11/07/22 11/07/22 11/08/22





  17:50 23:18 01:00


 


Temperature 36.9 C 36.8 C 


 


Heart Rate 65 66 62


 


Respiratory 16 16 16





Rate   


 


Blood Pressure 163/82 H 163/92 H 155/89 H


 


O2 Saturation 97 99 98














  11/08/22 11/08/22





  03:00 05:00


 


Temperature  


 


Heart Rate 61 62


 


Respiratory 16 16





Rate  


 


Blood Pressure 148/82 H 142/80 H


 


O2 Saturation 98 97








                                     Oxygen











O2 Source                      Room air

















- Labs


Labs: 


                                Laboratory Tests











  11/07/22 11/07/22





  21:05 21:05


 


WBC  10.8 


 


RBC  4.88 


 


Hgb  15.3 


 


Hct  44.6 


 


MCV  91.4 


 


MCH  31.4 H 


 


MCHC  34.3 


 


RDW  12.6 


 


Plt Count  196 


 


MPV  9.0 


 


Neut # (Auto)  8.0 H 


 


Lymph # (Auto)  1.7 


 


Mono # (Auto)  0.9 


 


Eos # (Auto)  0.2 


 


Baso # (Auto)  0.1 


 


Absolute Nucleated RBC  0.00 


 


Nucleated RBC %  0.0 


 


Sodium   132 L


 


Potassium   4.3


 


Chloride   96 L


 


Carbon Dioxide   27


 


Anion Gap   9.0


 


BUN   16


 


Creatinine   0.7


 


Estimated GFR (MDRD)   112


 


Glucose   136 H


 


Calcium   9.1


 


Total Bilirubin   0.9


 


AST   31


 


ALT   36


 


Alkaline Phosphatase   44


 


Total Protein   6.9


 


Albumin   3.9


 


Globulin   3.0


 


Albumin/Globulin Ratio   1.3


 


Lipase   29














PD MEDICAL DECISION MAKING





- ED course


ED course: 





66yM presents s/p hernia repair surgery friday with subsequent inability to have

normal BM. he has been on pain medication and stool regimen (softener and 

laxative) but has had only small amount of soft brown mushy stool come out. 

denies abdominal pain, urinary sx, fever.





CATHY reveals brown stool. internal and external nonthrombosed hemorrhoids. 

Mineral oil enema instilled. will reevaluate.





patient states enema helped and he had a bowel movement in the bathroom. rx for 

mineral oil enema sent to [facility]. return precautions given. f/u with 

his surgeon and pcp. 





Departure





- Departure


Disposition: 01 Home, Self Care


Clinical Impression: 


 Constipation





Condition: Good


Instructions:  ED Constipation


Prescriptions: 


Mineral Oil [Mineral Oil Enema] 1 ea RC QDBREAKFAST PRN 10 Days #133 ml


 PRN Reason: Constipation


Comments: 


You were seen in the ED for constipation and given a mineral oil enema. Please 

follow up in surgery clinic and also with your primary care provider. continue 

taking daily stool softener and laxative. [facility] [place]. return to the ED if you have other concerns.